# Patient Record
Sex: FEMALE | Race: OTHER | URBAN - METROPOLITAN AREA
[De-identification: names, ages, dates, MRNs, and addresses within clinical notes are randomized per-mention and may not be internally consistent; named-entity substitution may affect disease eponyms.]

---

## 2024-07-29 ENCOUNTER — OFFICE VISIT (OUTPATIENT)
Dept: PRIMARY CARE CLINIC | Facility: CLINIC | Age: 44
End: 2024-07-29
Payer: MEDICAID

## 2024-07-29 DIAGNOSIS — M76.822 POSTERIOR TIBIALIS TENDINITIS OF BOTH LOWER EXTREMITIES: Primary | ICD-10-CM

## 2024-07-29 DIAGNOSIS — M76.821 POSTERIOR TIBIALIS TENDINITIS OF BOTH LOWER EXTREMITIES: Primary | ICD-10-CM

## 2024-07-29 DIAGNOSIS — M72.2 PLANTAR FASCIITIS: ICD-10-CM

## 2024-07-29 PROCEDURE — 99213 OFFICE O/P EST LOW 20 MIN: CPT | Mod: 95,,, | Performed by: FAMILY MEDICINE

## 2024-07-29 RX ORDER — PREDNISONE 20 MG/1
20 TABLET ORAL DAILY
Qty: 10 TABLET | Refills: 0 | Status: SHIPPED | OUTPATIENT
Start: 2024-07-29

## 2024-07-29 RX ORDER — MELOXICAM 7.5 MG/1
7.5 TABLET ORAL DAILY
Qty: 60 TABLET | Refills: 5 | Status: SHIPPED | OUTPATIENT
Start: 2024-07-29

## 2024-07-29 RX ORDER — TRAMADOL HYDROCHLORIDE 50 MG/1
50 TABLET ORAL EVERY 6 HOURS PRN
Qty: 30 TABLET | Refills: 0 | Status: SHIPPED | OUTPATIENT
Start: 2024-07-29 | End: 2024-08-08

## 2024-07-29 NOTE — PROGRESS NOTES
Subjective:       Patient ID: taryn paiz is a 43 y.o. female.    Chief Complaint: No chief complaint on file.    HPI: Virtual Video Telehealth Visit     The patient location is:  Home  The chief complaint leading to consultation is:  Posterior tibial tendinitis and plantar fasciitis needs note for  Visit type: Virtual visit  Total time spent with patient:  20 minutes     Each patient to whom I provide medical services by telemedicine is:  (1) informed of the relationship between the physician and patient and the respective role of any other health care provider with respect to management of the patient; and (2) notified that they may decline to receive medical services by telemedicine and may withdraw from such care at any time. Patient verbally consented to receive this service via voice-only telephone call.       HPI:  See history of present illness above     Assessment and plan:  See plan below  43-year-old BF --diagnosed--post tibial tendonitis---and plantar fasciitis---2 years ago. Saw Dr García in [facility]----given a walking cast and a  night splint and shoe inserts  Did well until a few days ago.  Working a lot 11-12 hours. a shift--left ankle and left foot--painful like when initially had problems with the ankle and foot.  Patient mainly using shoe inserts now.  No trauma--in 1 week worked  73 hours so on feet a lot--has appoint with podiatrist but not until August 8, 2024  No lupus rheumatoid gout.   Has appointment with new PCP on August 5, 2024    ROS:  Skin: no psoriasis, eczema, skin cancer  HEENT: No headache, ocular pain, blurred vision, diplopia, epistaxis, hoarseness change in voice, thyroid trouble  Lung: No pneumonia, asthma, Tb, wheezing, SOB, no smoking  Heart: No chest pain, ankle edema, palpitations, MI, rut murmur, hypertension, hyperlipidemia--no stent bypass arrhythmia  Abdomen: No nausea, vomiting, diarrhea, constipation, ulcers, hepatitis,  melena, hematochezia, hematemesis history  cholecystectomy  : no UTI, renal disease, stones  GYN partial hysterectomy--mammogram close to 2 years ago  MS: no fractures, O/A, lupus, rheumatoid, gout--posterior tibial tendinitis and plantar fasciitis see history of present illness  Neuro: No dizziness, LOC, seizures   No diabetes, no anemia, no anxiety, no depression   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    Objective:   Physical Exam:  No physical exam was done this was a virtual visit physical below is from a cell propagated new patient physical  General: Well nourished, well developed, no acute distress  Skin: No lesions  HEENT: Eyes PERRLA, EOM intact, nose patent, throat non-erythematous   NECK: Supple, no bruits, No JVD, no nodes  Lungs: Clear, no rales, rhonchi, wheezing  Heart: Regular rate and rhythm, no murmurs, gallops, or rubs  Abdomen: flat, bowel sounds positive, no tenderness, or organomegaly  MS: Range of motion and muscle strength intact  Neuro: Alert, CN intact, oriented X 3  Extremities: No cyanosis, clubbing, or edema         Assessment:       1. Posterior tibialis tendinitis of both lower extremities    2. Plantar fasciitis        Plan:       Posterior tibialis tendinitis of both lower extremities    Plantar fasciitis    Other orders  -     traMADoL (ULTRAM) 50 mg tablet; Take 1 tablet (50 mg total) by mouth every 6 (six) hours as needed.  Dispense: 30 tablet; Refill: 0  -     predniSONE (DELTASONE) 20 MG tablet; Take 1 tablet (20 mg total) by mouth once daily.  Dispense: 10 tablet; Refill: 0  -     meloxicam (MOBIC) 7.5 MG tablet; Take 1 tablet (7.5 mg total) by mouth once daily.  Dispense: 60 tablet; Refill: 5        Main problems  Posterior tibial tendinitis and plantar fasciitis--patient has been wearing walking cast boot---off of work x3 days---trying to get to wear able to tolerate the pain so able to work until able to see the podiatrist.  Patient getting a new PCP--suggested she get routine lab  chest x-ray EKG--CBCs CMP lipids T4 TSH chest x-ray EKG as physical for 43-year-old  Should have mammogram yearly--had hysterectomy  Needs note for work from Saturday7- until Wednesday 07/31/2020  To whom it May concern       This letter as requested by Sharri Morse--she was seen by me 07/29/2024 for problems with her left ankle and foot---patient was given medications in told should be able to return to work on Wednesday 07/31/2024.  She does have follow-up appointment with a new PCP and podiatrist.  If you have any further questions please feel free to write.  [facility]       Sincerely Michael Pisano MD

## 2024-07-29 NOTE — LETTER
July 30, 2024    taryn D lito  [address] [facility] [address]             [facility] [address]  [address]  [facility] [address]  Phone: [phone]  Fax: [phone] To Whom It May Concern:         This letter was requested by Taryn Lito. She was seen by me on 07/29/2024 for problems with her left ankle and foot. She was given medications and was notified that should be able to return to work on Wednesday 07/31/2024. She does have a follow-up appointment with a new PCP and Podiatrist. If you have any further questions please feel free to write.  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      If you have any questions or concerns, please don't hesitate to call.    Sincerely,        Michael Pisano MD

## 2024-07-30 ENCOUNTER — TELEPHONE (OUTPATIENT)
Dept: PRIMARY CARE CLINIC | Facility: CLINIC | Age: 44
End: 2024-07-30
Payer: MEDICAID

## 2024-07-30 NOTE — TELEPHONE ENCOUNTER
----- Message from Sonia Ryan sent at 7/30/2024  9:49 AM CDT -----  Contact: [phone]  Patient called, stated that Dr Pisano was suppose to sent her a note for work and after visit summary for visit on 07/30/24. If that can be sent. Thank you.

## 2025-03-17 ENCOUNTER — ON-DEMAND VIRTUAL (OUTPATIENT)
Dept: URGENT CARE | Facility: CLINIC | Age: 45
End: 2025-03-17
Payer: MEDICAID

## 2025-03-17 DIAGNOSIS — H10.9 CONJUNCTIVITIS, UNSPECIFIED CONJUNCTIVITIS TYPE, UNSPECIFIED LATERALITY: Primary | ICD-10-CM

## 2025-03-17 RX ORDER — TOBRAMYCIN 3 MG/ML
1 SOLUTION/ DROPS OPHTHALMIC EVERY 4 HOURS
Qty: 5 ML | Refills: 0 | Status: SHIPPED | OUTPATIENT
Start: 2025-03-17

## 2025-03-17 NOTE — PROGRESS NOTES
Subjective:      Patient ID: taryn paiz is a 44 y.o. female.    Vitals:  vitals were not taken for this visit.     Chief Complaint: Conjunctivitis      Visit Type: TELE AUDIOVISUAL - This visit was conducted virtually based on  subjective information and limited objective exam    Present with the patient at the time of consultation: TELEMED PRESENT WITH PATIENT: None  LOCATION OF PATIENT [place]  Two patient identifiers used to verify patient- saying out date of birth and full name.       History reviewed. No pertinent past medical history.  History reviewed. No pertinent surgical history.  Review of patient's allergies indicates:  Not on File  Medications Ordered Prior to Encounter[1]  No family history on file.    Medications Ordered                "[id number] - [place], [place] - [address]   [address] [facility] [address]    Telephone: [phone]   Fax: [phone]   Hours: Not open 24 hours                         E-Prescribed (1 of 1)              tobramycin sulfate 0.3% (TOBREX) 0.3 % ophthalmic solution    Sig: Place 1 drop into the right eye every 4 (four) hours.       Start: 3/17/25     Quantity: 5 mL Refills: 0                           Ohs Peq Odvv Intake    3/17/2025  1:19 PM CDT - Filed by Patient   What is your current physical address in the event of a medical emergency? [address] [place] [address]   Are you able to take your vital signs? No   Please attach any relevant images or files    Is your employer contracted with [facility]? No         43 yo female with c/o uri symptoms. She states she also has gritty feeling to right eye. She states runny nose cough. She states symptoms started today. She denies fever.  She states when she gets cold and flu she gets pink eye.   She denies vision changes  No discharge.         Constitution: Positive for generalized weakness. Negative for fever.   HENT:  Positive for  congestion and postnasal drip. Negative for sore throat.    Cardiovascular:  Negative for sob on exertion.   Eyes:  Positive for eye discharge and eye redness.   Respiratory:  Positive for cough and sputum production. Negative for shortness of breath and wheezing.    Allergic/Immunologic: Positive for sneezing.   Neurological:  Negative for headaches.        Objective:   The physical exam was conducted virtually.    AAO x 3 ; no acute distress noted; appearance normal; mood and behavior normal; thought process normal  Head- normocephalic  Nose- appears normal, no discharge or erythema  Eyes- pupils appear normal in size, no drainage, no erythema  Ears- normal appearing; no discharge, no erythema  Mouth- appears normal  Oropharynx- no erythema, lesions  Lungs- breathing at a normal rate, no acute distress noted  Heart- no reports of tachycardia, palpitations, chest pain  Abdomen- non distended, non tender reported by patient  Skin- warm and dry, no erythema or edema noted by patient or visualized  Psych- as above; no si/hi      Assessment:     1. Conjunctivitis, unspecified conjunctivitis type, unspecified laterality        Plan:   Recommendations:    Avoid contact with eyes and perform good hand hygiene.     If you were prescribed antibiotic eye drops take as directed.     Do not wear contacts for one week. Avoid eye make-up.     Do not rub eyes. Wash hands frequently and disinfect common surfaces to avoid spread.    Warm and cool compresses may be soothing.    Artificial tears may help lubricate and rinse the eye. You may refrigerate the drops for added comfort    For allergic conjunctivitis, use antihistamine eye drops such as Pataday or Zaditor as directed on package.       Follow up with Eye Doctor if no improvement in symptoms or for any worsening of symptoms.             Thank you for choosing [facility]!    Our goal in the [facility] is to always provide outstanding  medical care. You may receive a survey by mail or e-mail in the next week regarding your experience today. We would greatly appreciate you completing and returning the survey. Your feedback provides us with a way to recognize our staff who provide very good care, and it helps us learn how to improve when your experience was below our aspiration of excellence.         We appreciate you trusting us with your medical care. We hope you feel better soon. We will be happy to take care of you for all of your future medical needs.    You must understand that you've received an Urgent Care treatment only and that you may be released before all your medical problems are known or treated. You, the patient, will arrange for follow up care as instructed.    Follow up with your PCP or specialty clinic as directed in the next 1-2 weeks if not improved or as needed.  You can call [phone] to schedule an appointment with the appropriate provider.    If your condition worsens we recommend that you receive another evaluation in person, with your primary care provider, urgent care or at the emergency room immediately or contact your primary medical clinics after hours call service to discuss your concerns.         Conjunctivitis, unspecified conjunctivitis type, unspecified laterality  -     tobramycin sulfate 0.3% (TOBREX) 0.3 % ophthalmic solution; Place 1 drop into the right eye every 4 (four) hours.  Dispense: 5 mL; Refill: 0                         [1]   Current Outpatient Medications on File Prior to Visit   Medication Sig Dispense Refill    meloxicam (MOBIC) 7.5 MG tablet Take 1 tablet (7.5 mg total) by mouth once daily. 60 tablet 5    predniSONE (DELTASONE) 20 MG tablet Take 1 tablet (20 mg total) by mouth once daily. 10 tablet 0     No current facility-administered medications on file prior to visit.

## 2025-03-17 NOTE — LETTER
March 17, 2025    taryn paiz  [address] [facility] [address]             Virtual Visit - Urgent Care  Urgent Care  [address] [facility] [address]   March 17, 2025     Patient: taryn paiz   YOB: 1980   Date of Visit: 3/17/2025       To Whom it May Concern:    taryn paiz was seen virtually on 3/17/2025. She may return to work on 3/19/2025 .    Please excuse her from any classes or work missed.    If you have any questions or concerns, please don't hesitate to call.    Sincerely,           Cici Killian, REAGANP

## 2025-05-21 ENCOUNTER — TELEPHONE (OUTPATIENT)
Dept: ADMINISTRATIVE | Facility: OTHER | Age: 45
End: 2025-05-21
Payer: MEDICAID

## 2025-05-21 ENCOUNTER — PATIENT MESSAGE (OUTPATIENT)
Dept: ADMINISTRATIVE | Facility: OTHER | Age: 45
End: 2025-05-21
Payer: MEDICAID

## 2025-05-21 NOTE — TELEPHONE ENCOUNTER
Telephone call to patient, following up recent VPN appointment. No answer, left voicemail offering assistance in scheduling with a PCP to establish care. If interested, please call [facility] at [phone].

## 2025-07-01 ENCOUNTER — HOSPITAL ENCOUNTER (EMERGENCY)
Facility: HOSPITAL | Age: 45
Discharge: HOME | End: 2025-07-01
Attending: EMERGENCY MEDICINE
Payer: MEDICAID

## 2025-07-01 VITALS
OXYGEN SATURATION: 97 % | HEART RATE: 70 BPM | SYSTOLIC BLOOD PRESSURE: 138 MMHG | BODY MASS INDEX: 37.56 KG/M2 | DIASTOLIC BLOOD PRESSURE: 88 MMHG | HEIGHT: 64 IN | WEIGHT: 220 LBS | RESPIRATION RATE: 18 BRPM | TEMPERATURE: 98 F

## 2025-07-01 DIAGNOSIS — M25.572 LEFT ANKLE PAIN: Primary | ICD-10-CM

## 2025-07-01 LAB
B-HCG UR QL: NEGATIVE
CTP QC/QA: YES

## 2025-07-01 PROCEDURE — 99284 EMERGENCY DEPT VISIT MOD MDM: CPT | Mod: 25

## 2025-07-01 PROCEDURE — 81025 URINE PREGNANCY TEST: CPT | Performed by: EMERGENCY MEDICINE

## 2025-07-01 PROCEDURE — 96372 THER/PROPH/DIAG INJ SC/IM: CPT

## 2025-07-01 PROCEDURE — 63600175 PHARM REV CODE 636 W HCPCS: Mod: JZ,TB

## 2025-07-01 RX ORDER — DICLOFENAC SODIUM 10 MG/G
2 GEL TOPICAL 2 TIMES DAILY
Qty: 100 G | Refills: 0 | Status: SHIPPED | OUTPATIENT
Start: 2025-07-01 | End: 2025-07-11

## 2025-07-01 RX ORDER — NAPROXEN 500 MG/1
500 TABLET ORAL 2 TIMES DAILY
Qty: 20 TABLET | Refills: 0 | Status: SHIPPED | OUTPATIENT
Start: 2025-07-01

## 2025-07-01 RX ORDER — KETOROLAC TROMETHAMINE 30 MG/ML
30 INJECTION, SOLUTION INTRAMUSCULAR; INTRAVENOUS
Status: COMPLETED | OUTPATIENT
Start: 2025-07-01 | End: 2025-07-01

## 2025-07-01 RX ORDER — METHYLPREDNISOLONE 4 MG/1
TABLET ORAL
Qty: 1 EACH | Refills: 0 | Status: SHIPPED | OUTPATIENT
Start: 2025-07-01

## 2025-07-01 RX ADMIN — KETOROLAC TROMETHAMINE 30 MG: 30 INJECTION, SOLUTION INTRAMUSCULAR; INTRAVENOUS at 10:07

## 2025-07-02 NOTE — ED PROVIDER NOTES
Encounter Date: 7/1/2025       History     Chief Complaint   Patient presents with    Ankle Pain     Pt arrived in ED, c/o non traumatic, left ankle pain and swelling x 2 days. Pt denies any recent injuries or trauma. Pt denies having any further complaints.      44 y.o. female with PMHx of asthma and plantar fasciitis presents for emergent evaluation of left ankle pain X 2 days.  She denies any known trauma, increased activity or known provoking factors.  She reports history of bilateral plantar pain due to plantar fasciitis with chronic bilateral foot pain but states that this pain is different.  She noted mild edema to the medial and dorsal aspect of the foot.  She states pain is constant but worse with ambulation.  Denies any decreased range of motion, decreased strength, numbness, tingling, discoloration, or inability to bear weight.  She has attempted treatment with an old prescription of Norflex and Tolmentin with minimal relief.  States she has not followed up with her podiatrist. Patient denies fever/chills, headache, chest pain, shortness of breath, abdominal pain, nausea/vomiting/diarrhea, urinary concerns, myalgias, or any other complaints at this time.     The history is provided by the patient.     Review of patient's allergies indicates:   Allergen Reactions    Adhesive     Bactrim [sulfamethoxazole-trimethoprim]      History reviewed. No pertinent past medical history.  History reviewed. No pertinent surgical history.  No family history on file.  Social History[1]  Review of Systems   Constitutional:  Negative for chills and fever.   HENT:  Negative for sore throat.    Respiratory:  Negative for shortness of breath.    Cardiovascular:  Negative for chest pain.   Gastrointestinal:  Negative for abdominal pain, diarrhea, nausea and vomiting.   Genitourinary:  Negative for decreased urine volume, dysuria and hematuria.   Musculoskeletal:  Positive for arthralgias, gait problem and joint swelling.  Negative for myalgias.   Skin:  Negative for rash.   Neurological:  Negative for weakness, numbness and headaches.   Psychiatric/Behavioral: Negative.         Physical Exam     Initial Vitals [07/01/25 2050]   BP Pulse Resp Temp SpO2   (!) 140/78 76 16 97.9 °F (36.6 °C) 98 %      MAP       --         Physical Exam    Nursing note and vitals reviewed.  Constitutional: She appears well-developed and well-nourished. She is not diaphoretic. No distress.   HENT:   Head: Normocephalic and atraumatic.   Right Ear: External ear normal.   Left Ear: External ear normal.   Eyes: Conjunctivae and EOM are normal.   Neck: Neck supple.   Normal range of motion.  Cardiovascular:  Normal rate and intact distal pulses.           Pulmonary/Chest: No stridor. No respiratory distress.   Musculoskeletal:         General: Normal range of motion.      Cervical back: Normal range of motion and neck supple.      Comments: Left Ankle:  No obvious deformity. There is mild swelling and tenderness to left medial malleolus.  No increased warmth, erythema or palpable fluctuance.  No bony tenderness over navicular.  No tenderness over the base of the 5th metatarsal.  No proximal fibular tenderness. She is able to bear weight.   Ankle dorsiflexion and ankle plantar flexion are intact with 5/5 strength and full ROM.  Intact sensation to light touch. Distal capillary refill takes less than 2 seconds.  PT and DP pulses are 2+ bilaterally.       Neurological: She is alert and oriented to person, place, and time. She has normal strength. No sensory deficit.   Skin: Capillary refill takes less than 2 seconds. No rash noted.   Psychiatric: She has a normal mood and affect. Thought content normal.         ED Course   Procedures  Labs Reviewed   POCT URINE PREGNANCY       Result Value    POC Preg Test, Ur Negative       Acceptable Yes            Imaging Results              X-Ray Ankle Complete Left (Final result)  Result time 07/01/25  21:43:49      Final result by Carol Ann Romo MD (07/01/25 21:43:49)                   Impression:      As above described.      Electronically signed by: Carol Ann Romo  Date:    07/01/2025  Time:    21:43               Narrative:    EXAMINATION:  THREE VIEWS OF THE LEFT ANKLE    CLINICAL HISTORY:  Pain in left ankle and joints of left foot    TECHNIQUE:  AP, lateral and oblique views of the left ankle    COMPARISON:  None.    FINDINGS:  Medial malleolar soft tissue swelling is present.  Three views of the left ankle demonstrate no definite acute fracture or dislocation.  A small plantar spur is present.  If foot pain, recommend dedicated images of the foot.                                       Medications   ketorolac injection 30 mg (30 mg Intramuscular Given 7/1/25 2206)     Medical Decision Making  Patient is a afebrile, well appearing 44 y.o.  female who presents for evaluation of left ankle pain and swelling X 2 days. Patient is able to ambulate. Denies cyanosis, pallor, decreased strength or sensation. Pulses normal. Neurovascularly intact. The patient remained comfortable and stable during their visit in the ED. Details of ED course documented in ED workup.     Differential Diagnosis includes, but is not limited to: Fracture, dislocation, cellulitis, bursitis, muscle strain, ligament tear/sprain, soft tissue contusion, osteoarthritis, gout     All historical, clinical, and radiographic findings reviewed and discussed with patient.  Patient has been advised of the diagnosis of sprain.  Could also be reactive swelling from chronic plantar fasciitis. There are no concerning features on physical exam to suggest an emergent or life threatening condition. No further intervention is indicated at this time, the patient is at low risk for an emergent/life threatening medical condition at this time and I am of the belief that that it is safe to discharge the patient from the emergency department.     ED MEDS  ADMINISTERED:  ketorolac injection 30 mg (30 mg Intramuscular Given 7/1/25 2206)    D/c with supportive care.  Recommended RICE.  Patient has been counseled regarding the need for follow-up as well as the indications to return to the emergency room should new or worrisome developments (including but not limited to worsening pain, cyanosis, or loss of strength or sensation) occur. Additionally, patient instructed to follow up with PCP and with podiatry for recheck of today's complaints.    Discharge and follow-up instructions discussed with the patient who expressed understanding and willingness to comply with recommendations. Patient discharged from the emergency department in stable condition, in no acute distress.     Amount and/or Complexity of Data Reviewed  External Data Reviewed: labs, radiology and notes.  Radiology: ordered. Decision-making details documented in ED Course.    Risk  OTC drugs.  Prescription drug management.  Diagnosis or treatment significantly limited by social determinants of health.               ED Course as of 07/01/25 2213   Tue Jul 01, 2025 2146 X-Ray Ankle Complete Left  Medial malleolar soft tissue swelling is present.  Three views of the left ankle demonstrate no definite acute fracture or dislocation.  A small plantar spur is present. [CC]      ED Course User Index  [CC] Tangela Muro PA-C                           Clinical Impression:  Final diagnoses:  [M25.572] Left ankle pain (Primary)          ED Disposition Condition    Discharge Stable          ED Prescriptions       Medication Sig Dispense Start Date End Date Auth. Provider    methylPREDNISolone (MEDROL DOSEPACK) 4 mg tablet Take as directed on packaging 1 each 7/1/2025 -- Tangela Muro PA-C    diclofenac sodium (VOLTAREN) 1 % Gel Apply 2 g topically 2 (two) times daily. for 10 days 100 g 7/1/2025 7/11/2025 Tangela Muro PA-C    naproxen (NAPROSYN) 500 MG tablet Take 1 tablet (500 mg total) by mouth 2 (two)  times daily. 20 tablet 7/1/2025 -- Tangela Muro PA-C          Follow-up Information       Follow up With Specialties Details Why Contact Info    [facility] [place] [facility] [place] [facility] Emergency Medicine Go to  For new or worsening symptoms [address] [facility] [place] [id number] [phone]    YOUR PCP    WITHIN THE NEXT WEEK OR SOONER AS NEEDED                   [1]   Social History  Tobacco Use    Smoking status: Never    Smokeless tobacco: Never   Substance Use Topics    Alcohol use: Not Currently        Tangela Muro PA-C  07/01/25 2215

## 2025-07-02 NOTE — DISCHARGE INSTRUCTIONS
Apply a compressive ACE bandage. Rest and elevate the affected painful area.  Apply cold compresses intermittently as needed. Take full course of Medrol Dosepak (steroid) as prescribed.  Naproxen is an anti-inflammatory.  Take this medication with food to avoid any stomach irritation.  You may take 500-1000mg of tylenol in between dosages for pain.  Rub Voltaren gel to area of pain.   As pain recedes, begin normal activities slowly as tolerated.      Be sure to follow up with your PCP as well as podiatrist for further evaluation management of your symptoms. Return to the ER if symptoms worsen such as severe intolerable pain, weakness of the arm, swelling or discoloration, chest pain, shortness of breath, dizziness, high persistent fevers or any other abnormal or alarming symptoms.

## 2025-08-12 ENCOUNTER — HOSPITAL ENCOUNTER (EMERGENCY)
Facility: HOSPITAL | Age: 45
Discharge: HOME | End: 2025-08-12
Attending: EMERGENCY MEDICINE
Payer: MEDICAID

## 2025-08-12 VITALS
DIASTOLIC BLOOD PRESSURE: 62 MMHG | HEIGHT: 64 IN | OXYGEN SATURATION: 99 % | RESPIRATION RATE: 16 BRPM | BODY MASS INDEX: 37.56 KG/M2 | WEIGHT: 220 LBS | TEMPERATURE: 98 F | SYSTOLIC BLOOD PRESSURE: 110 MMHG | HEART RATE: 82 BPM

## 2025-08-12 DIAGNOSIS — M25.579 ANKLE PAIN: ICD-10-CM

## 2025-08-12 DIAGNOSIS — M25.572 ACUTE LEFT ANKLE PAIN: Primary | ICD-10-CM

## 2025-08-12 PROCEDURE — 99284 EMERGENCY DEPT VISIT MOD MDM: CPT | Mod: 25

## 2025-08-12 PROCEDURE — 96372 THER/PROPH/DIAG INJ SC/IM: CPT

## 2025-08-12 PROCEDURE — 63600175 PHARM REV CODE 636 W HCPCS

## 2025-08-12 RX ORDER — KETOROLAC TROMETHAMINE 30 MG/ML
30 INJECTION, SOLUTION INTRAMUSCULAR; INTRAVENOUS
Status: COMPLETED | OUTPATIENT
Start: 2025-08-12 | End: 2025-08-12

## 2025-08-12 RX ORDER — MELOXICAM 15 MG/1
15 TABLET ORAL DAILY
Qty: 12 TABLET | Refills: 0 | Status: SHIPPED | OUTPATIENT
Start: 2025-08-12

## 2025-08-12 RX ORDER — MELOXICAM 15 MG/1
15 TABLET ORAL DAILY
Qty: 12 TABLET | Refills: 0 | Status: SHIPPED | OUTPATIENT
Start: 2025-08-12 | End: 2025-08-12

## 2025-08-12 RX ORDER — DEXAMETHASONE SODIUM PHOSPHATE 4 MG/ML
8 INJECTION, SOLUTION INTRA-ARTICULAR; INTRALESIONAL; INTRAMUSCULAR; INTRAVENOUS; SOFT TISSUE
Status: COMPLETED | OUTPATIENT
Start: 2025-08-12 | End: 2025-08-12

## 2025-08-12 RX ADMIN — DEXAMETHASONE SODIUM PHOSPHATE 8 MG: 4 INJECTION, SOLUTION INTRA-ARTICULAR; INTRALESIONAL; INTRAMUSCULAR; INTRAVENOUS; SOFT TISSUE at 11:08

## 2025-08-12 RX ADMIN — KETOROLAC TROMETHAMINE 30 MG: 30 INJECTION, SOLUTION INTRAMUSCULAR; INTRAVENOUS at 11:08

## 2025-09-02 ENCOUNTER — OFFICE VISIT (OUTPATIENT)
Dept: ORTHOPEDICS | Facility: CLINIC | Age: 45
End: 2025-09-02
Payer: MEDICAID

## 2025-09-02 VITALS — BODY MASS INDEX: 37.56 KG/M2 | HEIGHT: 64 IN | WEIGHT: 220 LBS

## 2025-09-02 DIAGNOSIS — M76.821 POSTERIOR TIBIALIS TENDINITIS OF BOTH LOWER EXTREMITIES: Primary | ICD-10-CM

## 2025-09-02 DIAGNOSIS — M76.822 POSTERIOR TIBIALIS TENDINITIS OF BOTH LOWER EXTREMITIES: Primary | ICD-10-CM

## 2025-09-02 DIAGNOSIS — Z75.8 DOES NOT HAVE PRIMARY CARE PROVIDER: ICD-10-CM

## 2025-09-02 PROCEDURE — 1159F MED LIST DOCD IN RCRD: CPT | Mod: CPTII,,, | Performed by: ORTHOPAEDIC SURGERY

## 2025-09-02 PROCEDURE — 99999 PR PBB SHADOW E&M-EST. PATIENT-LVL III: CPT | Mod: PBBFAC,,, | Performed by: ORTHOPAEDIC SURGERY

## 2025-09-02 PROCEDURE — 99203 OFFICE O/P NEW LOW 30 MIN: CPT | Mod: S$PBB,,, | Performed by: ORTHOPAEDIC SURGERY

## 2025-09-02 PROCEDURE — 3008F BODY MASS INDEX DOCD: CPT | Mod: CPTII,,, | Performed by: ORTHOPAEDIC SURGERY

## 2025-09-02 PROCEDURE — 99213 OFFICE O/P EST LOW 20 MIN: CPT | Mod: PBBFAC,PN | Performed by: ORTHOPAEDIC SURGERY

## 2025-09-02 RX ORDER — DICLOFENAC SODIUM 10 MG/G
2 GEL TOPICAL 3 TIMES DAILY
Qty: 100 G | Refills: 2 | Status: SHIPPED | OUTPATIENT
Start: 2025-09-02